# Patient Record
Sex: MALE | Race: WHITE | NOT HISPANIC OR LATINO | Employment: FULL TIME | URBAN - NONMETROPOLITAN AREA
[De-identification: names, ages, dates, MRNs, and addresses within clinical notes are randomized per-mention and may not be internally consistent; named-entity substitution may affect disease eponyms.]

---

## 2017-08-26 ENCOUNTER — APPOINTMENT (OUTPATIENT)
Dept: URGENT CARE | Facility: PHYSICIAN GROUP | Age: 52
End: 2017-08-26
Payer: COMMERCIAL

## 2017-08-26 ENCOUNTER — APPOINTMENT (OUTPATIENT)
Dept: RADIOLOGY | Facility: IMAGING CENTER | Age: 52
End: 2017-08-26
Attending: CHIROPRACTOR
Payer: COMMERCIAL

## 2017-08-26 DIAGNOSIS — M54.5 LOW BACK PAIN, UNSPECIFIED BACK PAIN LATERALITY, UNSPECIFIED CHRONICITY, WITH SCIATICA PRESENCE UNSPECIFIED: ICD-10-CM

## 2017-08-26 PROCEDURE — 72100 X-RAY EXAM L-S SPINE 2/3 VWS: CPT | Mod: TC | Performed by: FAMILY MEDICINE

## 2017-08-26 PROCEDURE — 72170 X-RAY EXAM OF PELVIS: CPT | Mod: TC | Performed by: FAMILY MEDICINE

## 2022-01-06 ENCOUNTER — TELEPHONE (OUTPATIENT)
Dept: SCHEDULING | Facility: IMAGING CENTER | Age: 57
End: 2022-01-06

## 2022-04-18 ENCOUNTER — OFFICE VISIT (OUTPATIENT)
Dept: MEDICAL GROUP | Facility: PHYSICIAN GROUP | Age: 57
End: 2022-04-18
Payer: COMMERCIAL

## 2022-04-18 VITALS
HEART RATE: 73 BPM | BODY MASS INDEX: 28.86 KG/M2 | DIASTOLIC BLOOD PRESSURE: 74 MMHG | RESPIRATION RATE: 18 BRPM | SYSTOLIC BLOOD PRESSURE: 118 MMHG | WEIGHT: 213.1 LBS | TEMPERATURE: 98.2 F | HEIGHT: 72 IN | OXYGEN SATURATION: 94 %

## 2022-04-18 DIAGNOSIS — Z23 ENCOUNTER FOR VACCINATION: ICD-10-CM

## 2022-04-18 DIAGNOSIS — Z00.00 HEALTHCARE MAINTENANCE: ICD-10-CM

## 2022-04-18 DIAGNOSIS — R73.09 ABNORMAL GLUCOSE: ICD-10-CM

## 2022-04-18 DIAGNOSIS — Z86.010 HISTORY OF COLON POLYPS: ICD-10-CM

## 2022-04-18 DIAGNOSIS — I10 PRIMARY HYPERTENSION: ICD-10-CM

## 2022-04-18 DIAGNOSIS — Z23 NEED FOR VACCINATION: ICD-10-CM

## 2022-04-18 DIAGNOSIS — E78.49 OTHER HYPERLIPIDEMIA: ICD-10-CM

## 2022-04-18 DIAGNOSIS — K40.90 NON-RECURRENT UNILATERAL INGUINAL HERNIA WITHOUT OBSTRUCTION OR GANGRENE: ICD-10-CM

## 2022-04-18 DIAGNOSIS — E55.9 VITAMIN D DEFICIENCY: ICD-10-CM

## 2022-04-18 PROBLEM — Z86.0100 HISTORY OF COLON POLYPS: Status: ACTIVE | Noted: 2022-04-18

## 2022-04-18 PROBLEM — K46.9 NON-RECURRENT ABDOMINAL HERNIA WITHOUT OBSTRUCTION OR GANGRENE: Status: ACTIVE | Noted: 2022-04-18

## 2022-04-18 LAB
HBA1C MFR BLD: 4.9 % (ref 0–5.6)
INT CON NEG: NORMAL
INT CON POS: NORMAL

## 2022-04-18 PROCEDURE — 90471 IMMUNIZATION ADMIN: CPT | Performed by: NURSE PRACTITIONER

## 2022-04-18 PROCEDURE — 90715 TDAP VACCINE 7 YRS/> IM: CPT | Performed by: NURSE PRACTITIONER

## 2022-04-18 PROCEDURE — 83036 HEMOGLOBIN GLYCOSYLATED A1C: CPT | Performed by: NURSE PRACTITIONER

## 2022-04-18 PROCEDURE — 99214 OFFICE O/P EST MOD 30 MIN: CPT | Mod: 25 | Performed by: NURSE PRACTITIONER

## 2022-04-18 RX ORDER — AMLODIPINE BESYLATE 10 MG/1
10 TABLET ORAL DAILY
COMMUNITY
Start: 2022-02-24 | End: 2023-05-15 | Stop reason: SDUPTHER

## 2022-04-18 RX ORDER — LISINOPRIL 2.5 MG/1
2.5 TABLET ORAL DAILY
COMMUNITY
Start: 2022-02-10 | End: 2023-05-15 | Stop reason: SDUPTHER

## 2022-04-18 RX ORDER — ATORVASTATIN CALCIUM 40 MG/1
40 TABLET, FILM COATED ORAL DAILY
COMMUNITY
Start: 2022-02-24 | End: 2023-05-15 | Stop reason: SDUPTHER

## 2022-04-18 ASSESSMENT — PATIENT HEALTH QUESTIONNAIRE - PHQ9: CLINICAL INTERPRETATION OF PHQ2 SCORE: 0

## 2022-04-18 NOTE — LETTER
[facility]  Cheryl M. Demucha, A.P.RJUAN JOSÉ.  [address] [place] [address]  Fax: [phone]   Authorization for Release/Disclosure of   Protected Health Information   Name: SERGO ALLAN : 1965 SSN: [id number]   Address: [place] [address] Phone:    [phone] (home)    I authorize the entity listed below to release/disclose the PHI below to:   [facility]/Cheryl M. Demucha, A.P.R.JOB and Cheryl M. Demucha, A.P.R.N.   Provider or Entity Name:     Address   City, State, Zip   Phone:      Fax:     Reason for request: continuity of care   Information to be released:    [  ] LAST COLONOSCOPY,  including any PATH REPORT and follow-up  [  ] LAST FIT/COLOGUARD RESULT [  ] LAST DEXA  [  ] LAST MAMMOGRAM  [  ] LAST PAP  [  ] LAST LABS [  ] RETINA EXAM REPORT  [  ] IMMUNIZATION RECORDS  [  ] Release all info      [  ] Check here and initial the line next to each item to release ALL health information INCLUDING  _____ Care and treatment for drug and / or alcohol abuse  _____ HIV testing, infection status, or AIDS  _____ Genetic Testing    DATES OF SERVICE OR TIME PERIOD TO BE DISCLOSED: _____________  I understand and acknowledge that:  * This Authorization may be revoked at any time by you in writing, except if your health information has already been used or disclosed.  * Your health information that will be used or disclosed as a result of you signing this authorization could be re-disclosed by the recipient. If this occurs, your re-disclosed health information may no longer be protected by State or Federal laws.  * You may refuse to sign this Authorization. Your refusal will not affect your ability to obtain treatment.  * This Authorization becomes effective upon signing and will  on (date) __________.      If no date is indicated, this Authorization will  one (1) year from the signature date.    Name: Sergo Allan    Signature:   Date:     2022       PLEASE  FAX REQUESTED RECORDS BACK TO: [phone]

## 2022-04-18 NOTE — ASSESSMENT & PLAN NOTE
Was advised at his last eye exam that some mild changes/beginning of cataracts were noted; gets eye exam annually     Pt's labs were just done in March 2022  Not due again until next year

## 2022-04-18 NOTE — ASSESSMENT & PLAN NOTE
Pt was told he had an inguinal hernia a few years ago; never had u/s done; isn't bothersome currently; will send for records from [facility]; consider u/s if s/s worsen

## 2022-04-18 NOTE — PROGRESS NOTES
Subjective:     CC:   Chief Complaint   Patient presents with   • Establish Care       HPI:   Sergo presents today with    Healthcare maintenance  Was advised at his last eye exam that some mild changes/beginning of cataracts were noted; gets eye exam annually     Pt's labs were just done in March 2022  Not due again until next year     History of colon polyps  Colonoscopy done in the recent past; will sent for records from [facility]    Abnormal glucose  Most recent labs from March, 2022 showed mildly elevate glucose (108); A1c done in office today-4.9      Other hyperlipidemia  Lipid panel done March, 2022  LDL was 106; all others wnl; cont w/atorvastatin      Primary hypertension  Currently well controlled w/amlodipine and lisinopril; monitor periodically and report >130/90    Non-recurrent abdominal hernia without obstruction or gangrene  Pt was told he had an inguinal hernia a few years ago; never had u/s done; isn't bothersome currently; will send for records from [facility]; consider u/s if s/s worsen              ROS per HPI    Objective:     Exam:  /74   Pulse 73   Temp 36.8 °C (98.2 °F) (Temporal)   Resp 18   Ht 1.829 m (6')   Wt 96.7 kg (213 lb 1.6 oz)   SpO2 94%   BMI 28.90 kg/m²  Body mass index is 28.9 kg/m².    Physical Exam:  Constitutional: Well-developed and well-nourished male  in NAD. Not diaphoretic. No distress.   Skin: warm, dry, intact, no evidence of rash or concerning lesions  Head: Atraumatic without lesions.  Eyes: Conjunctivae  are normal. Pupils are equal, round. No scleral icterus.   Ears:  External ears unremarkable.   Neck: Supple, trachea midline.   Cardiovascular: Regular rate and rhythm without murmur.   Pulmonary: Clear to ausculation. Normal effort. No rales, ronchi, or wheezing.  Extremities: No cyanosis, clubbing, erythema, nor edema.   Neurological: Alert and oriented x 3.   Psychiatric:  Behavior, mood, and affect are appropriate.        Assessment & Plan:     56 y.o.  male with the following -     1. Abnormal glucose A1c in office today was 4.9     2. History of colon polyps sending for records     3. Other hyperlipidemia cont w/current medication; cont w/healthy lifestyle choices     4. Primary hypertension  Monitor 1-2x a week; report values >130/90    5. Healthcare maintenance labs due 1 year     6. Non-recurrent unilateral inguinal hernia without obstruction or gangrene monitor     7. Vitamin D deficiency lab ordered    8. Encounter for vaccination td given today; pt will get Shingrix at pharmacy     Other orders  - amLODIPine (NORVASC) 10 MG Tab; Take 10 mg by mouth every day. FOR 90 DAYS  - atorvastatin (LIPITOR) 40 MG Tab; Take 40 mg by mouth every day. FOR 90 DAYS  - lisinopril (PRINIVIL) 2.5 MG Tab; Take 2.5 mg by mouth every day. FOR 90 DAYS     Pt will get vit d done in near future; I will message him w/the results and order his annual labs to be done end March 2023 so he can do them before our visit     Return in about 1 year (around 4/18/2023) for lab results, gen check in.    Please note that this dictation was created using voice recognition software. I have made every reasonable attempt to correct obvious errors, but I expect that there are errors of grammar and possibly content that I did not discover before finalizing the note.

## 2022-04-18 NOTE — ASSESSMENT & PLAN NOTE
Most recent labs from March, 2022 showed mildly elevate glucose (108); A1c done in office today-4.9

## 2022-04-25 ENCOUNTER — HOSPITAL ENCOUNTER (OUTPATIENT)
Dept: LAB | Facility: MEDICAL CENTER | Age: 57
End: 2022-04-25
Attending: NURSE PRACTITIONER
Payer: COMMERCIAL

## 2022-04-25 DIAGNOSIS — E55.9 VITAMIN D DEFICIENCY: ICD-10-CM

## 2022-04-25 LAB — 25(OH)D3 SERPL-MCNC: 41 NG/ML (ref 30–100)

## 2022-04-25 PROCEDURE — 82306 VITAMIN D 25 HYDROXY: CPT

## 2022-04-25 PROCEDURE — 36415 COLL VENOUS BLD VENIPUNCTURE: CPT

## 2022-04-28 DIAGNOSIS — I10 PRIMARY HYPERTENSION: ICD-10-CM

## 2022-04-28 DIAGNOSIS — E78.49 OTHER HYPERLIPIDEMIA: ICD-10-CM

## 2022-04-28 DIAGNOSIS — R53.83 FATIGUE, UNSPECIFIED TYPE: ICD-10-CM

## 2022-04-28 DIAGNOSIS — E55.9 VITAMIN D DEFICIENCY: ICD-10-CM

## 2022-04-28 DIAGNOSIS — Z12.5 SCREENING FOR PROSTATE CANCER: ICD-10-CM

## 2022-04-28 RX ORDER — SILDENAFIL 50 MG/1
TABLET, FILM COATED ORAL
COMMUNITY
Start: 2022-04-22 | End: 2023-05-15 | Stop reason: SDUPTHER

## 2023-05-13 ENCOUNTER — HOSPITAL ENCOUNTER (OUTPATIENT)
Dept: LAB | Facility: MEDICAL CENTER | Age: 58
End: 2023-05-13
Attending: NURSE PRACTITIONER
Payer: COMMERCIAL

## 2023-05-13 DIAGNOSIS — I10 PRIMARY HYPERTENSION: ICD-10-CM

## 2023-05-13 DIAGNOSIS — Z12.5 SCREENING FOR PROSTATE CANCER: ICD-10-CM

## 2023-05-13 DIAGNOSIS — E55.9 VITAMIN D DEFICIENCY: ICD-10-CM

## 2023-05-13 DIAGNOSIS — E78.49 OTHER HYPERLIPIDEMIA: ICD-10-CM

## 2023-05-13 DIAGNOSIS — R53.83 FATIGUE, UNSPECIFIED TYPE: ICD-10-CM

## 2023-05-13 LAB
25(OH)D3 SERPL-MCNC: 53 NG/ML (ref 30–100)
ALBUMIN SERPL BCP-MCNC: 4.4 G/DL (ref 3.2–4.9)
ALBUMIN/GLOB SERPL: 1.6 G/DL
ALP SERPL-CCNC: 68 U/L (ref 30–99)
ALT SERPL-CCNC: 22 U/L (ref 2–50)
ANION GAP SERPL CALC-SCNC: 11 MMOL/L (ref 7–16)
AST SERPL-CCNC: 16 U/L (ref 12–45)
BASOPHILS # BLD AUTO: 1.1 % (ref 0–1.8)
BASOPHILS # BLD: 0.05 K/UL (ref 0–0.12)
BILIRUB SERPL-MCNC: 0.3 MG/DL (ref 0.1–1.5)
BUN SERPL-MCNC: 13 MG/DL (ref 8–22)
CALCIUM ALBUM COR SERPL-MCNC: 9.2 MG/DL (ref 8.5–10.5)
CALCIUM SERPL-MCNC: 9.5 MG/DL (ref 8.5–10.5)
CHLORIDE SERPL-SCNC: 108 MMOL/L (ref 96–112)
CHOLEST SERPL-MCNC: 171 MG/DL (ref 100–199)
CO2 SERPL-SCNC: 23 MMOL/L (ref 20–33)
CREAT SERPL-MCNC: 1.03 MG/DL (ref 0.5–1.4)
EOSINOPHIL # BLD AUTO: 0.13 K/UL (ref 0–0.51)
EOSINOPHIL NFR BLD: 2.8 % (ref 0–6.9)
ERYTHROCYTE [DISTWIDTH] IN BLOOD BY AUTOMATED COUNT: 38.7 FL (ref 35.9–50)
FASTING STATUS PATIENT QL REPORTED: NORMAL
GFR SERPLBLD CREATININE-BSD FMLA CKD-EPI: 85 ML/MIN/1.73 M 2
GLOBULIN SER CALC-MCNC: 2.8 G/DL (ref 1.9–3.5)
GLUCOSE SERPL-MCNC: 106 MG/DL (ref 65–99)
HCT VFR BLD AUTO: 45.4 % (ref 42–52)
HDLC SERPL-MCNC: 50 MG/DL
HGB BLD-MCNC: 15.1 G/DL (ref 14–18)
IMM GRANULOCYTES # BLD AUTO: 0.01 K/UL (ref 0–0.11)
IMM GRANULOCYTES NFR BLD AUTO: 0.2 % (ref 0–0.9)
LDLC SERPL CALC-MCNC: 108 MG/DL
LYMPHOCYTES # BLD AUTO: 1.3 K/UL (ref 1–4.8)
LYMPHOCYTES NFR BLD: 28.3 % (ref 22–41)
MCH RBC QN AUTO: 29 PG (ref 27–33)
MCHC RBC AUTO-ENTMCNC: 33.3 G/DL (ref 33.7–35.3)
MCV RBC AUTO: 87.3 FL (ref 81.4–97.8)
MONOCYTES # BLD AUTO: 0.45 K/UL (ref 0–0.85)
MONOCYTES NFR BLD AUTO: 9.8 % (ref 0–13.4)
NEUTROPHILS # BLD AUTO: 2.65 K/UL (ref 1.82–7.42)
NEUTROPHILS NFR BLD: 57.8 % (ref 44–72)
NRBC # BLD AUTO: 0 K/UL
NRBC BLD-RTO: 0 /100 WBC
PLATELET # BLD AUTO: 201 K/UL (ref 164–446)
PMV BLD AUTO: 10 FL (ref 9–12.9)
POTASSIUM SERPL-SCNC: 4.7 MMOL/L (ref 3.6–5.5)
PROT SERPL-MCNC: 7.2 G/DL (ref 6–8.2)
PSA SERPL-MCNC: 1.97 NG/ML (ref 0–4)
RBC # BLD AUTO: 5.2 M/UL (ref 4.7–6.1)
SODIUM SERPL-SCNC: 142 MMOL/L (ref 135–145)
TRIGL SERPL-MCNC: 63 MG/DL (ref 0–149)
WBC # BLD AUTO: 4.6 K/UL (ref 4.8–10.8)

## 2023-05-13 PROCEDURE — 36415 COLL VENOUS BLD VENIPUNCTURE: CPT

## 2023-05-13 PROCEDURE — 84153 ASSAY OF PSA TOTAL: CPT

## 2023-05-13 PROCEDURE — 82306 VITAMIN D 25 HYDROXY: CPT

## 2023-05-13 PROCEDURE — 80061 LIPID PANEL: CPT

## 2023-05-13 PROCEDURE — 80053 COMPREHEN METABOLIC PANEL: CPT

## 2023-05-13 PROCEDURE — 85025 COMPLETE CBC W/AUTO DIFF WBC: CPT

## 2023-05-15 ENCOUNTER — OFFICE VISIT (OUTPATIENT)
Dept: MEDICAL GROUP | Facility: PHYSICIAN GROUP | Age: 58
End: 2023-05-15
Payer: COMMERCIAL

## 2023-05-15 VITALS
OXYGEN SATURATION: 97 % | DIASTOLIC BLOOD PRESSURE: 76 MMHG | WEIGHT: 215.9 LBS | SYSTOLIC BLOOD PRESSURE: 120 MMHG | BODY MASS INDEX: 29.24 KG/M2 | TEMPERATURE: 97.1 F | RESPIRATION RATE: 18 BRPM | HEIGHT: 72 IN | HEART RATE: 70 BPM

## 2023-05-15 DIAGNOSIS — N52.9 ERECTILE DYSFUNCTION, UNSPECIFIED ERECTILE DYSFUNCTION TYPE: ICD-10-CM

## 2023-05-15 DIAGNOSIS — R73.09 ABNORMAL GLUCOSE: ICD-10-CM

## 2023-05-15 DIAGNOSIS — K40.90 NON-RECURRENT UNILATERAL INGUINAL HERNIA WITHOUT OBSTRUCTION OR GANGRENE: ICD-10-CM

## 2023-05-15 DIAGNOSIS — Z86.010 HISTORY OF COLON POLYPS: ICD-10-CM

## 2023-05-15 DIAGNOSIS — I10 PRIMARY HYPERTENSION: ICD-10-CM

## 2023-05-15 DIAGNOSIS — E78.49 OTHER HYPERLIPIDEMIA: ICD-10-CM

## 2023-05-15 PROCEDURE — 3078F DIAST BP <80 MM HG: CPT | Performed by: NURSE PRACTITIONER

## 2023-05-15 PROCEDURE — 99214 OFFICE O/P EST MOD 30 MIN: CPT | Performed by: NURSE PRACTITIONER

## 2023-05-15 PROCEDURE — 3074F SYST BP LT 130 MM HG: CPT | Performed by: NURSE PRACTITIONER

## 2023-05-15 RX ORDER — AMLODIPINE BESYLATE 10 MG/1
10 TABLET ORAL DAILY
Qty: 90 TABLET | Refills: 3 | Status: SHIPPED | OUTPATIENT
Start: 2023-05-15 | End: 2024-05-14

## 2023-05-15 RX ORDER — ATORVASTATIN CALCIUM 40 MG/1
40 TABLET, FILM COATED ORAL DAILY
Qty: 90 TABLET | Refills: 3 | Status: SHIPPED | OUTPATIENT
Start: 2023-05-15 | End: 2024-05-14

## 2023-05-15 RX ORDER — LISINOPRIL 2.5 MG/1
2.5 TABLET ORAL DAILY
Qty: 90 TABLET | Refills: 3 | Status: SHIPPED | OUTPATIENT
Start: 2023-05-15 | End: 2024-05-14

## 2023-05-15 RX ORDER — SILDENAFIL 50 MG/1
TABLET, FILM COATED ORAL
Qty: 10 TABLET | Refills: 11 | Status: SHIPPED | OUTPATIENT
Start: 2023-05-15

## 2023-05-15 ASSESSMENT — FIBROSIS 4 INDEX: FIB4 SCORE: 0.95

## 2023-05-15 ASSESSMENT — PATIENT HEALTH QUESTIONNAIRE - PHQ9: CLINICAL INTERPRETATION OF PHQ2 SCORE: 0

## 2023-05-15 NOTE — ASSESSMENT & PLAN NOTE
Records from [facility] for colonoscopy received, dated 1/2020  2 polyps removed from cecum; rec 3 yr f/up 1/2023

## 2023-05-15 NOTE — PROGRESS NOTES
Subjective:     CC:   Chief Complaint   Patient presents with    Medication Refill    Annual Exam       HPI:   Sergo presents today with    History of colon polyps  Records from [facility] for colonoscopy received, dated 1/2020  2 polyps removed from cecum; rec 3 yr f/up 1/2023    Abnormal glucose  Well controllled; most recent labs only showed slight elevation    Non-recurrent abdominal hernia without obstruction or gangrene  Pt reports feeling a little pain a few times from his hernia; would like to know the name of a surgeon who specializes in that  Was advised Migel Castle specializes in that, so I gave him his name; I also advised him to check [email or web address], [email or web address] and [email or web address] for patient reviews            ROS per HPI    Objective:     Exam:  /76   Pulse 70   Temp 36.2 °C (97.1 °F) (Temporal)   Resp 18   Ht 1.829 m (6')   Wt 97.9 kg (215 lb 14.4 oz)   SpO2 97%   BMI 29.28 kg/m²  Body mass index is 29.28 kg/m².    Physical Exam:  Constitutional: Well-developed and well-nourished male Not diaphoretic. No distress.   Skin: warm, dry, intact, no evidence of rash or concerning lesions  Head: Atraumatic without lesions.  Eyes: Conjunctivae are normal. Pupils are equal, round. No scleral icterus.   Ears:  External ears unremarkable.   Neck: Supple, trachea midline. No thyromegaly present. No cervical or supraclavicular lymphadenopathy.  Cardiovascular: Regular rate and rhythm without murmur.   Pulmonary: Clear to ausculation. Normal effort. No rales, ronchi, or wheezing.  Extremities: No cyanosis, clubbing, erythema, nor edema.   Neurological: Alert and oriented x 3.   Psychiatric:  Behavior, mood, and affect are appropriate.        Assessment & Plan:     57 y.o. male with the following -     1. Primary hypertension  - amLODIPine (NORVASC) 10 MG Tab; Take 1 Tablet by mouth every day.  DAYS  Dispense: 90 Tablet; Refill: 3  - lisinopril (PRINIVIL) 2.5 MG Tab; Take 1 Tablet by mouth  every day.  days  Dispense: 90 Tablet; Refill: 3    2. Erectile dysfunction, unspecified erectile dysfunction type  - sildenafil citrate (VIAGRA) 50 MG tablet; TAKE 1 TABLET BY MOUTH ONCE DAILY 1 HOUR BEFORE SEXUAL ACTIVITY  Dispense: 10 Tablet; Refill: 11    3. Other hyperlipidemia  - atorvastatin (LIPITOR) 40 MG Tab; Take 1 Tablet by mouth every day. For 365 days  Dispense: 90 Tablet; Refill: 3    4. History of colon polyps  Most recent colonoscopy in Jan, 2020 recommended 3 year f/up; pt would like to wait a little longer     5. Abnormal glucose    6. Non-recurrent unilateral inguinal hernia without obstruction or gangrene  Will refer to general surgery when pt is ready         Return in about 1 year (around 5/15/2024) for lab results.    Please note that this dictation was created using voice recognition software. I have made every reasonable attempt to correct obvious errors, but I expect that there are errors of grammar and possibly content that I did not discover before finalizing the note.

## 2023-05-16 NOTE — ASSESSMENT & PLAN NOTE
Pt reports feeling a little pain a few times from his hernia; would like to know the name of a surgeon who specializes in that  Was advised Migel Castle specializes in that, so I gave him his name; I also advised him to check [email or web address], [email or web address] and [email or web address] for patient reviews

## 2023-06-22 ENCOUNTER — OFFICE VISIT (OUTPATIENT)
Dept: URGENT CARE | Facility: PHYSICIAN GROUP | Age: 58
End: 2023-06-22
Payer: COMMERCIAL

## 2023-06-22 VITALS
HEIGHT: 72 IN | TEMPERATURE: 98 F | WEIGHT: 216 LBS | RESPIRATION RATE: 16 BRPM | HEART RATE: 89 BPM | BODY MASS INDEX: 29.26 KG/M2 | DIASTOLIC BLOOD PRESSURE: 72 MMHG | SYSTOLIC BLOOD PRESSURE: 128 MMHG | OXYGEN SATURATION: 97 %

## 2023-06-22 DIAGNOSIS — R42 DIZZINESS: ICD-10-CM

## 2023-06-22 DIAGNOSIS — H60.399 ACUTE BACTERIAL OTITIS EXTERNA: ICD-10-CM

## 2023-06-22 LAB — GLUCOSE BLD-MCNC: 94 MG/DL (ref 65–99)

## 2023-06-22 PROCEDURE — 3078F DIAST BP <80 MM HG: CPT | Performed by: NURSE PRACTITIONER

## 2023-06-22 PROCEDURE — 82962 GLUCOSE BLOOD TEST: CPT | Performed by: NURSE PRACTITIONER

## 2023-06-22 PROCEDURE — 99213 OFFICE O/P EST LOW 20 MIN: CPT | Performed by: NURSE PRACTITIONER

## 2023-06-22 PROCEDURE — 3074F SYST BP LT 130 MM HG: CPT | Performed by: NURSE PRACTITIONER

## 2023-06-22 RX ORDER — OFLOXACIN 3 MG/ML
10 SOLUTION AURICULAR (OTIC) DAILY
Qty: 14 ML | Refills: 0 | Status: SHIPPED | OUTPATIENT
Start: 2023-06-22 | End: 2023-06-29

## 2023-06-22 RX ORDER — MECLIZINE HYDROCHLORIDE 25 MG/1
TABLET ORAL
Qty: 30 TABLET | Refills: 0 | Status: SHIPPED | OUTPATIENT
Start: 2023-06-22

## 2023-06-22 ASSESSMENT — FIBROSIS 4 INDEX: FIB4 SCORE: 0.95

## 2023-06-22 ASSESSMENT — VISUAL ACUITY: OU: 1

## 2023-06-22 NOTE — PROGRESS NOTES
Subjective:   Sergo Allan is a 57 y.o. male who presents for Vertigo (X6 days was laying down on floor and felt really dizzy, room spins worse in the mornings and gets better throughout the day,)      Very pleasant 57-year-old male presents to clinic today with 6-day history of dizziness and mild nausea.  Patient states that symptoms seem to be worse first thing in the morning and do improve throughout the day.  About 6 days ago he was doing stretching exercises and twisted his head to the right and when he looked forward he got severely dizzy and the symptoms have discontinued.  Has not tried any over-the-counter medications.    Complains of dizziness described as a spinning sensation  Occurs with  rapid head movements, rolling over in bed, first thing in the morning when going from a sitting to standing position  Denies dimming vision, visual floaters, speech change, confusion, unconsciousness, drowsiness, personality change, headaches, paresthesias otalgia, otorrhea, tinnitus, hearing loss         He does state that his symptoms seem to be improving.        Medications, Allergies, and current problem list reviewed today in Epic.     Objective:     /72   Pulse 89   Temp 36.7 °C (98 °F) (Temporal)   Resp 16   Ht 1.829 m (6')   Wt 98 kg (216 lb)   SpO2 97%     Physical Exam  Vitals reviewed.   Constitutional:       Appearance: Normal appearance.   HENT:      Head: Normocephalic.      Left Ear: Tympanic membrane, ear canal and external ear normal.      Ears:      Comments: Right ear canal: Mildly erythematous, moist white patches with tenderness during otic exam.  TM mildly erythematous with serosanguineous effusion.     Nose: Nose normal.      Mouth/Throat:      Mouth: Mucous membranes are moist.   Eyes:      General: Lids are normal. Vision grossly intact. Gaze aligned appropriately.      Extraocular Movements: Extraocular movements intact.      Right eye: No nystagmus.      Left eye: No nystagmus.       Conjunctiva/sclera: Conjunctivae normal.      Pupils: Pupils are equal, round, and reactive to light.   Cardiovascular:      Rate and Rhythm: Normal rate.   Pulmonary:      Effort: Pulmonary effort is normal.   Musculoskeletal:         General: Normal range of motion.      Cervical back: Normal range of motion and neck supple.   Skin:     General: Skin is warm and dry.   Neurological:      General: No focal deficit present.      Mental Status: He is alert and oriented to person, place, and time.      Comments: Sunni-Hallpike: negative  negative for dizziness or nystagmus    Psychiatric:         Mood and Affect: Mood normal.         Behavior: Behavior normal.         Assessment/Plan:     Diagnosis and associated orders:     1. Acute bacterial otitis externa  ofloxacin otic sol (FLOXIN OTIC) 0.3 % Solution      2. Dizziness  POCT Glucose    meclizine (ANTIVERT) 25 MG Tab         Comments/MDM:     HPI and physical exam findings are consistent with acute bacterial otitis externa most likely causing his symptoms of dizziness.  We will go ahead and treat with ofloxacin.  Prescription of meclizine to sent to pharmacy to help with intermittent vertigo symptoms.  Side effects medication discussed.  Recommended Tylenol Motrin as needed for pain.  POCT glucose was 94.  Low suspicion at this time that his dizziness symptoms are caused by hyper or hypoglycemia.  Recommended following up with primary care provider in the next 10 to 14 days.         Differential diagnosis, natural history, supportive care, and indications for immediate follow-up discussed.    Advised the patient to follow-up with the primary care physician for recheck, reevaluation, and consideration of further management.    Please note that this dictation was created using voice recognition software. I have made a reasonable attempt to correct obvious errors, but I expect that there are errors of grammar and possibly content that I did not discover before  finalizing the note.

## 2023-09-01 ENCOUNTER — APPOINTMENT (OUTPATIENT)
Dept: RADIOLOGY | Facility: MEDICAL CENTER | Age: 58
End: 2023-09-01
Attending: OTOLARYNGOLOGY
Payer: COMMERCIAL

## 2023-09-01 DIAGNOSIS — R42 VERTIGO: ICD-10-CM

## 2023-09-01 PROCEDURE — 700117 HCHG RX CONTRAST REV CODE 255: Performed by: OTOLARYNGOLOGY

## 2023-09-01 PROCEDURE — A9579 GAD-BASE MR CONTRAST NOS,1ML: HCPCS | Performed by: OTOLARYNGOLOGY

## 2023-09-01 PROCEDURE — 70553 MRI BRAIN STEM W/O & W/DYE: CPT

## 2023-09-01 RX ADMIN — GADOTERIDOL 15 ML: 279.3 INJECTION, SOLUTION INTRAVENOUS at 14:21

## 2024-05-13 ENCOUNTER — TELEPHONE (OUTPATIENT)
Dept: MEDICAL GROUP | Facility: PHYSICIAN GROUP | Age: 59
End: 2024-05-13

## 2024-05-13 ENCOUNTER — APPOINTMENT (OUTPATIENT)
Dept: MEDICAL GROUP | Facility: PHYSICIAN GROUP | Age: 59
End: 2024-05-13
Payer: COMMERCIAL

## 2024-05-13 VITALS
HEIGHT: 72 IN | HEART RATE: 77 BPM | SYSTOLIC BLOOD PRESSURE: 118 MMHG | RESPIRATION RATE: 16 BRPM | WEIGHT: 222 LBS | DIASTOLIC BLOOD PRESSURE: 80 MMHG | TEMPERATURE: 98.8 F | BODY MASS INDEX: 30.07 KG/M2 | OXYGEN SATURATION: 96 %

## 2024-05-13 DIAGNOSIS — E55.9 VITAMIN D DEFICIENCY: ICD-10-CM

## 2024-05-13 DIAGNOSIS — R73.09 ABNORMAL GLUCOSE: ICD-10-CM

## 2024-05-13 DIAGNOSIS — R53.83 OTHER FATIGUE: ICD-10-CM

## 2024-05-13 DIAGNOSIS — E78.49 OTHER HYPERLIPIDEMIA: ICD-10-CM

## 2024-05-13 DIAGNOSIS — Z12.5 ENCOUNTER FOR SCREENING FOR MALIGNANT NEOPLASM OF PROSTATE: ICD-10-CM

## 2024-05-13 DIAGNOSIS — N52.9 ERECTILE DYSFUNCTION, UNSPECIFIED ERECTILE DYSFUNCTION TYPE: ICD-10-CM

## 2024-05-13 PROCEDURE — 3079F DIAST BP 80-89 MM HG: CPT | Performed by: NURSE PRACTITIONER

## 2024-05-13 PROCEDURE — 3074F SYST BP LT 130 MM HG: CPT | Performed by: NURSE PRACTITIONER

## 2024-05-13 PROCEDURE — 99214 OFFICE O/P EST MOD 30 MIN: CPT | Performed by: NURSE PRACTITIONER

## 2024-05-13 RX ORDER — SILDENAFIL 50 MG/1
TABLET, FILM COATED ORAL
Qty: 10 TABLET | Refills: 11 | Status: SHIPPED | OUTPATIENT
Start: 2024-05-13

## 2024-05-13 ASSESSMENT — FIBROSIS 4 INDEX: FIB4 SCORE: 0.97

## 2024-05-13 ASSESSMENT — PATIENT HEALTH QUESTIONNAIRE - PHQ9: CLINICAL INTERPRETATION OF PHQ2 SCORE: 0

## 2024-05-13 NOTE — PROGRESS NOTES
Subjective:     CC:   Chief Complaint   Patient presents with    Medication Refill       HPI:   Dago presents today with    No problem-specific Assessment & Plan notes found for this encounter.              ROS per HPI    Objective:     Exam:  /80   Pulse 77   Temp 37.1 °C (98.8 °F) (Temporal)   Resp 16   Ht 1.829 m (6')   Wt 101 kg (222 lb)   SpO2 96%   BMI 30.11 kg/m²  Body mass index is 30.11 kg/m².    Physical Exam:  Constitutional: Well-developed and well-nourished male. Not diaphoretic. No distress.   Skin: warm, dry, intact, no evidence of rash or concerning lesions  Head: Atraumatic without lesions.  Eyes: Conjunctivae are normal. Pupils are equal, round. No scleral icterus.   Ears:  External ears unremarkable.   Neck: Supple, trachea midline. No thyromegaly present. No cervical or supraclavicular lymphadenopathy.  Cardiovascular: Regular rate and rhythm without murmur.   Pulmonary: Clear to ausculation. Normal effort. No rales, ronchi, or wheezing.  Extremities: No cyanosis, clubbing, erythema, nor edema.   Neurological: Alert and oriented x 3.   Psychiatric:  Behavior, mood, and affect are appropriate.        Assessment & Plan:     58 y.o. male with the following -     1. Erectile dysfunction, unspecified erectile dysfunction type  - sildenafil citrate (VIAGRA) 50 MG tablet; TAKE 1 TABLET BY MOUTH ONCE DAILY 1 HOUR BEFORE SEXUAL ACTIVITY  Dispense: 10 Tablet; Refill: 11    2. Abnormal glucose  - Comp Metabolic Panel; Future  - HEMOGLOBIN A1C; Future    3. Other hyperlipidemia  - Lipid Profile; Future    4. Vitamin D deficiency  - VITAMIN D,25 HYDROXY (DEFICIENCY); Future    5. Other fatigue  - CBC WITH DIFFERENTIAL; Future  - Comp Metabolic Panel; Future  - TSH WITH REFLEX TO FT4; Future    6. Encounter for screening for malignant neoplasm of prostate  - PROSTATE SPECIFIC AG SCREENING; Future       Will message pt w/lab results next week   Return in about 1 year (around 5/13/2025) for est care  w/new pcp as my last day is 5/30.    Please note that this dictation was created using voice recognition software. I have made every reasonable attempt to correct obvious errors, but I expect that there are errors of grammar and possibly content that I did not discover before finalizing the note.

## 2024-05-21 ENCOUNTER — HOSPITAL ENCOUNTER (OUTPATIENT)
Dept: LAB | Facility: MEDICAL CENTER | Age: 59
End: 2024-05-21
Attending: NURSE PRACTITIONER
Payer: COMMERCIAL

## 2024-05-21 DIAGNOSIS — E78.49 OTHER HYPERLIPIDEMIA: ICD-10-CM

## 2024-05-21 DIAGNOSIS — R73.09 ABNORMAL GLUCOSE: ICD-10-CM

## 2024-05-21 DIAGNOSIS — Z12.5 ENCOUNTER FOR SCREENING FOR MALIGNANT NEOPLASM OF PROSTATE: ICD-10-CM

## 2024-05-21 DIAGNOSIS — E55.9 VITAMIN D DEFICIENCY: ICD-10-CM

## 2024-05-21 DIAGNOSIS — R53.83 OTHER FATIGUE: ICD-10-CM

## 2024-05-21 LAB
BASOPHILS # BLD AUTO: 1.1 % (ref 0–1.8)
BASOPHILS # BLD: 0.05 K/UL (ref 0–0.12)
EOSINOPHIL # BLD AUTO: 0.14 K/UL (ref 0–0.51)
EOSINOPHIL NFR BLD: 3 % (ref 0–6.9)
ERYTHROCYTE [DISTWIDTH] IN BLOOD BY AUTOMATED COUNT: 38.4 FL (ref 35.9–50)
EST. AVERAGE GLUCOSE BLD GHB EST-MCNC: 103 MG/DL
HBA1C MFR BLD: 5.2 % (ref 4–5.6)
HCT VFR BLD AUTO: 43.8 % (ref 42–52)
HGB BLD-MCNC: 14.8 G/DL (ref 14–18)
IMM GRANULOCYTES # BLD AUTO: 0.01 K/UL (ref 0–0.11)
IMM GRANULOCYTES NFR BLD AUTO: 0.2 % (ref 0–0.9)
LYMPHOCYTES # BLD AUTO: 1.44 K/UL (ref 1–4.8)
LYMPHOCYTES NFR BLD: 30.8 % (ref 22–41)
MCH RBC QN AUTO: 29 PG (ref 27–33)
MCHC RBC AUTO-ENTMCNC: 33.8 G/DL (ref 32.3–36.5)
MCV RBC AUTO: 85.7 FL (ref 81.4–97.8)
MONOCYTES # BLD AUTO: 0.45 K/UL (ref 0–0.85)
MONOCYTES NFR BLD AUTO: 9.6 % (ref 0–13.4)
NEUTROPHILS # BLD AUTO: 2.58 K/UL (ref 1.82–7.42)
NEUTROPHILS NFR BLD: 55.3 % (ref 44–72)
NRBC # BLD AUTO: 0 K/UL
NRBC BLD-RTO: 0 /100 WBC (ref 0–0.2)
PLATELET # BLD AUTO: 201 K/UL (ref 164–446)
PMV BLD AUTO: 11 FL (ref 9–12.9)
RBC # BLD AUTO: 5.11 M/UL (ref 4.7–6.1)
WBC # BLD AUTO: 4.7 K/UL (ref 4.8–10.8)

## 2024-05-22 LAB
25(OH)D3 SERPL-MCNC: 67 NG/ML (ref 30–100)
ALBUMIN SERPL BCP-MCNC: 4.4 G/DL (ref 3.2–4.9)
ALBUMIN/GLOB SERPL: 1.8 G/DL
ALP SERPL-CCNC: 63 U/L (ref 30–99)
ALT SERPL-CCNC: 24 U/L (ref 2–50)
ANION GAP SERPL CALC-SCNC: 12 MMOL/L (ref 7–16)
AST SERPL-CCNC: 23 U/L (ref 12–45)
BILIRUB SERPL-MCNC: 0.5 MG/DL (ref 0.1–1.5)
BUN SERPL-MCNC: 23 MG/DL (ref 8–22)
CALCIUM ALBUM COR SERPL-MCNC: 9.3 MG/DL (ref 8.5–10.5)
CALCIUM SERPL-MCNC: 9.6 MG/DL (ref 8.5–10.5)
CHLORIDE SERPL-SCNC: 105 MMOL/L (ref 96–112)
CHOLEST SERPL-MCNC: 176 MG/DL (ref 100–199)
CO2 SERPL-SCNC: 22 MMOL/L (ref 20–33)
CREAT SERPL-MCNC: 0.93 MG/DL (ref 0.5–1.4)
FASTING STATUS PATIENT QL REPORTED: NORMAL
GFR SERPLBLD CREATININE-BSD FMLA CKD-EPI: 95 ML/MIN/1.73 M 2
GLOBULIN SER CALC-MCNC: 2.5 G/DL (ref 1.9–3.5)
GLUCOSE SERPL-MCNC: 98 MG/DL (ref 65–99)
HDLC SERPL-MCNC: 51 MG/DL
LDLC SERPL CALC-MCNC: 110 MG/DL
POTASSIUM SERPL-SCNC: 4.3 MMOL/L (ref 3.6–5.5)
PROT SERPL-MCNC: 6.9 G/DL (ref 6–8.2)
PSA SERPL-MCNC: 1.09 NG/ML (ref 0–4)
SODIUM SERPL-SCNC: 139 MMOL/L (ref 135–145)
TRIGL SERPL-MCNC: 73 MG/DL (ref 0–149)
TSH SERPL DL<=0.005 MIU/L-ACNC: 0.83 UIU/ML (ref 0.38–5.33)

## 2024-05-29 DIAGNOSIS — E78.49 OTHER HYPERLIPIDEMIA: ICD-10-CM

## 2024-05-29 DIAGNOSIS — I10 PRIMARY HYPERTENSION: ICD-10-CM

## 2024-05-29 RX ORDER — AMLODIPINE BESYLATE 10 MG/1
10 TABLET ORAL DAILY
Qty: 90 TABLET | Refills: 3 | Status: SHIPPED | OUTPATIENT
Start: 2024-05-29 | End: 2025-05-29

## 2024-05-29 RX ORDER — ATORVASTATIN CALCIUM 40 MG/1
40 TABLET, FILM COATED ORAL DAILY
Qty: 90 TABLET | Refills: 3 | Status: SHIPPED | OUTPATIENT
Start: 2024-05-29 | End: 2025-05-29

## 2024-05-29 RX ORDER — ATORVASTATIN CALCIUM 40 MG/1
40 TABLET, FILM COATED ORAL DAILY
Qty: 90 TABLET | Refills: 0 | OUTPATIENT
Start: 2024-05-29

## 2024-05-29 RX ORDER — AMLODIPINE BESYLATE 10 MG/1
10 TABLET ORAL DAILY
Qty: 90 TABLET | Refills: 0 | OUTPATIENT
Start: 2024-05-29

## 2024-05-29 RX ORDER — LISINOPRIL 2.5 MG/1
2.5 TABLET ORAL DAILY
Qty: 90 TABLET | Refills: 3 | Status: SHIPPED | OUTPATIENT
Start: 2024-05-29 | End: 2025-05-29

## 2024-05-29 NOTE — TELEPHONE ENCOUNTER
Requested Prescriptions     Pending Prescriptions Disp Refills    amLODIPine (NORVASC) 10 MG Tab 90 Tablet 3     Sig: Take 1 Tablet by mouth every day.  DAYS    atorvastatin (LIPITOR) 40 MG Tab 90 Tablet 3     Sig: Take 1 Tablet by mouth every day. For 365 days    lisinopril (PRINIVIL) 2.5 MG Tab 90 Tablet 3     Sig: Take 1 Tablet by mouth every day.  days      Last office visit: 5/13/24  Last lab: 5/21/24

## 2024-06-14 ENCOUNTER — DOCUMENTATION (OUTPATIENT)
Dept: HEALTH INFORMATION MANAGEMENT | Facility: OTHER | Age: 59
End: 2024-06-14
Payer: COMMERCIAL

## 2024-07-08 ENCOUNTER — TELEPHONE (OUTPATIENT)
Dept: HEALTH INFORMATION MANAGEMENT | Facility: OTHER | Age: 59
End: 2024-07-08
Payer: COMMERCIAL

## 2024-10-24 ENCOUNTER — OFFICE VISIT (OUTPATIENT)
Dept: DERMATOLOGY | Facility: IMAGING CENTER | Age: 59
End: 2024-10-24
Payer: COMMERCIAL

## 2024-10-24 DIAGNOSIS — D23.9 DERMATOFIBROMA: ICD-10-CM

## 2024-10-24 DIAGNOSIS — L57.0 ACTINIC KERATOSIS: ICD-10-CM

## 2024-10-24 PROCEDURE — 99203 OFFICE O/P NEW LOW 30 MIN: CPT | Mod: 25 | Performed by: STUDENT IN AN ORGANIZED HEALTH CARE EDUCATION/TRAINING PROGRAM

## 2024-10-24 PROCEDURE — 17000 DESTRUCT PREMALG LESION: CPT | Performed by: STUDENT IN AN ORGANIZED HEALTH CARE EDUCATION/TRAINING PROGRAM

## 2025-01-01 SDOH — HEALTH STABILITY: PHYSICAL HEALTH: ON AVERAGE, HOW MANY DAYS PER WEEK DO YOU ENGAGE IN MODERATE TO STRENUOUS EXERCISE (LIKE A BRISK WALK)?: 3 DAYS

## 2025-01-01 SDOH — ECONOMIC STABILITY: FOOD INSECURITY: WITHIN THE PAST 12 MONTHS, YOU WORRIED THAT YOUR FOOD WOULD RUN OUT BEFORE YOU GOT MONEY TO BUY MORE.: NEVER TRUE

## 2025-01-01 SDOH — HEALTH STABILITY: PHYSICAL HEALTH: ON AVERAGE, HOW MANY MINUTES DO YOU ENGAGE IN EXERCISE AT THIS LEVEL?: 40 MIN

## 2025-01-01 SDOH — ECONOMIC STABILITY: TRANSPORTATION INSECURITY
IN THE PAST 12 MONTHS, HAS LACK OF TRANSPORTATION KEPT YOU FROM MEETINGS, WORK, OR FROM GETTING THINGS NEEDED FOR DAILY LIVING?: NO

## 2025-01-01 SDOH — ECONOMIC STABILITY: INCOME INSECURITY: HOW HARD IS IT FOR YOU TO PAY FOR THE VERY BASICS LIKE FOOD, HOUSING, MEDICAL CARE, AND HEATING?: NOT HARD AT ALL

## 2025-01-01 SDOH — HEALTH STABILITY: MENTAL HEALTH
STRESS IS WHEN SOMEONE FEELS TENSE, NERVOUS, ANXIOUS, OR CAN'T SLEEP AT NIGHT BECAUSE THEIR MIND IS TROUBLED. HOW STRESSED ARE YOU?: NOT AT ALL

## 2025-01-01 SDOH — ECONOMIC STABILITY: FOOD INSECURITY: WITHIN THE PAST 12 MONTHS, THE FOOD YOU BOUGHT JUST DIDN'T LAST AND YOU DIDN'T HAVE MONEY TO GET MORE.: NEVER TRUE

## 2025-01-01 SDOH — ECONOMIC STABILITY: INCOME INSECURITY: IN THE LAST 12 MONTHS, WAS THERE A TIME WHEN YOU WERE NOT ABLE TO PAY THE MORTGAGE OR RENT ON TIME?: NO

## 2025-01-01 SDOH — ECONOMIC STABILITY: HOUSING INSECURITY
IN THE LAST 12 MONTHS, WAS THERE A TIME WHEN YOU DID NOT HAVE A STEADY PLACE TO SLEEP OR SLEPT IN A SHELTER (INCLUDING NOW)?: NO

## 2025-01-01 SDOH — ECONOMIC STABILITY: TRANSPORTATION INSECURITY
IN THE PAST 12 MONTHS, HAS THE LACK OF TRANSPORTATION KEPT YOU FROM MEDICAL APPOINTMENTS OR FROM GETTING MEDICATIONS?: NO

## 2025-01-01 SDOH — ECONOMIC STABILITY: TRANSPORTATION INSECURITY
IN THE PAST 12 MONTHS, HAS LACK OF RELIABLE TRANSPORTATION KEPT YOU FROM MEDICAL APPOINTMENTS, MEETINGS, WORK OR FROM GETTING THINGS NEEDED FOR DAILY LIVING?: NO

## 2025-01-01 ASSESSMENT — LIFESTYLE VARIABLES
HOW OFTEN DO YOU HAVE A DRINK CONTAINING ALCOHOL: 2-3 TIMES A WEEK
HOW OFTEN DO YOU HAVE SIX OR MORE DRINKS ON ONE OCCASION: NEVER
AUDIT-C TOTAL SCORE: 4
HOW MANY STANDARD DRINKS CONTAINING ALCOHOL DO YOU HAVE ON A TYPICAL DAY: 3 OR 4
SKIP TO QUESTIONS 9-10: 0

## 2025-01-01 ASSESSMENT — SOCIAL DETERMINANTS OF HEALTH (SDOH)
IN A TYPICAL WEEK, HOW MANY TIMES DO YOU TALK ON THE PHONE WITH FAMILY, FRIENDS, OR NEIGHBORS?: ONCE A WEEK
IN A TYPICAL WEEK, HOW MANY TIMES DO YOU TALK ON THE PHONE WITH FAMILY, FRIENDS, OR NEIGHBORS?: ONCE A WEEK
HOW OFTEN DO YOU GET TOGETHER WITH FRIENDS OR RELATIVES?: NEVER
HOW OFTEN DO YOU ATTEND CHURCH OR RELIGIOUS SERVICES?: NEVER
HOW OFTEN DO YOU ATTENT MEETINGS OF THE CLUB OR ORGANIZATION YOU BELONG TO?: 1 TO 4 TIMES PER YEAR
HOW OFTEN DO YOU GET TOGETHER WITH FRIENDS OR RELATIVES?: NEVER
HOW MANY DRINKS CONTAINING ALCOHOL DO YOU HAVE ON A TYPICAL DAY WHEN YOU ARE DRINKING: 3 OR 4
IN THE PAST 12 MONTHS, HAS THE ELECTRIC, GAS, OIL, OR WATER COMPANY THREATENED TO SHUT OFF SERVICE IN YOUR HOME?: NO
WITHIN THE PAST 12 MONTHS, YOU WORRIED THAT YOUR FOOD WOULD RUN OUT BEFORE YOU GOT THE MONEY TO BUY MORE: NEVER TRUE
HOW HARD IS IT FOR YOU TO PAY FOR THE VERY BASICS LIKE FOOD, HOUSING, MEDICAL CARE, AND HEATING?: NOT HARD AT ALL
HOW OFTEN DO YOU ATTENT MEETINGS OF THE CLUB OR ORGANIZATION YOU BELONG TO?: 1 TO 4 TIMES PER YEAR
DO YOU BELONG TO ANY CLUBS OR ORGANIZATIONS SUCH AS CHURCH GROUPS UNIONS, FRATERNAL OR ATHLETIC GROUPS, OR SCHOOL GROUPS?: YES
HOW OFTEN DO YOU ATTEND CHURCH OR RELIGIOUS SERVICES?: NEVER
HOW OFTEN DO YOU HAVE A DRINK CONTAINING ALCOHOL: 2-3 TIMES A WEEK
HOW OFTEN DO YOU HAVE SIX OR MORE DRINKS ON ONE OCCASION: NEVER
DO YOU BELONG TO ANY CLUBS OR ORGANIZATIONS SUCH AS CHURCH GROUPS UNIONS, FRATERNAL OR ATHLETIC GROUPS, OR SCHOOL GROUPS?: YES

## 2025-01-06 ENCOUNTER — OFFICE VISIT (OUTPATIENT)
Dept: MEDICAL GROUP | Facility: PHYSICIAN GROUP | Age: 60
End: 2025-01-06
Payer: COMMERCIAL

## 2025-01-06 VITALS
HEART RATE: 65 BPM | HEIGHT: 72 IN | RESPIRATION RATE: 16 BRPM | BODY MASS INDEX: 29.66 KG/M2 | TEMPERATURE: 97.9 F | DIASTOLIC BLOOD PRESSURE: 70 MMHG | SYSTOLIC BLOOD PRESSURE: 118 MMHG | WEIGHT: 219 LBS

## 2025-01-06 DIAGNOSIS — I10 PRIMARY HYPERTENSION: ICD-10-CM

## 2025-01-06 DIAGNOSIS — Z13.9 ENCOUNTER FOR SCREENING: ICD-10-CM

## 2025-01-06 DIAGNOSIS — R42 DIZZINESS ON STANDING: ICD-10-CM

## 2025-01-06 DIAGNOSIS — T46.6X5A STATIN MYOPATHY: ICD-10-CM

## 2025-01-06 DIAGNOSIS — Z86.0100 HISTORY OF COLON POLYPS: ICD-10-CM

## 2025-01-06 DIAGNOSIS — Z23 ENCOUNTER FOR IMMUNIZATION: ICD-10-CM

## 2025-01-06 DIAGNOSIS — G72.0 STATIN MYOPATHY: ICD-10-CM

## 2025-01-06 DIAGNOSIS — E78.49 OTHER HYPERLIPIDEMIA: ICD-10-CM

## 2025-01-06 PROBLEM — R73.09 ABNORMAL GLUCOSE: Status: RESOLVED | Noted: 2022-04-18 | Resolved: 2025-01-06

## 2025-01-06 PROCEDURE — 90471 IMMUNIZATION ADMIN: CPT

## 2025-01-06 PROCEDURE — 99204 OFFICE O/P NEW MOD 45 MIN: CPT | Mod: 25

## 2025-01-06 PROCEDURE — 90656 IIV3 VACC NO PRSV 0.5 ML IM: CPT

## 2025-01-06 ASSESSMENT — FIBROSIS 4 INDEX: FIB4 SCORE: 1.35

## 2025-01-06 ASSESSMENT — ENCOUNTER SYMPTOMS
COUGH: 0
INSOMNIA: 0
DIARRHEA: 0
CONSTIPATION: 0
HEADACHES: 0
WEIGHT LOSS: 0
DEPRESSION: 0
BACK PAIN: 0
HEARTBURN: 0

## 2025-01-06 ASSESSMENT — PATIENT HEALTH QUESTIONNAIRE - PHQ9: CLINICAL INTERPRETATION OF PHQ2 SCORE: 0

## 2025-01-06 NOTE — PROGRESS NOTES
"Subjective:     CC:  Diagnoses of Dizziness on standing, Primary hypertension, Other hyperlipidemia, Statin myopathy, History of colon polyps, Encounter for screening, and Encounter for immunization were pertinent to this visit.    HISTORY OF THE PRESENT ILLNESS: Patient is a 59 y.o. male. This pleasant patient is here today to establish care and discuss persistent/worsening dizziness on standing.     Problem   Statin Myopathy    Has stopped statins in the past because of muscle related pain but conintued them because his cholesterol numbers went up and he could tolerate this side effect.  However it was never confirmed that this was actually due to statin use.  He states that he continues to have symtoms, although he expresses doubt that it is the statin medication that is causing it.     Dizziness On Standing    Patient states he has had similar symptoms over the last several years has noticed more frequently.  He did complete a cardiac stress test a couple years ago that came back normal, his blood pressure has remained in normal limits, he has never had problems with low blood sugar, and neurologically he is intact without issue.  He states that he has had several family members with \"clogged carotid arteries that needed surgery to unclog them\"     History of Colon Polyps    Ordering colonoscopy1/2025, denies colon ca fam hx      Other Hyperlipidemia    Onset: 2022  Risk-Enhancing Factor(s): None  Clinical ASCVD: No  LDL >190: No  ASCVD risk: 5.5%    Current med: Atorvastatin 40 mg  Side effects: Generalized muscle pain that her tolerates    Lab Results   Component Value Date/Time    CHOLSTRLTOT 176 05/21/2024 0812    TRIGLYCERIDE 73 05/21/2024 0812    HDL 51 05/21/2024 0812     (H) 05/21/2024 0812        Primary Hypertension    Has been on current regimen for 5 years, taking amlodipine 10 mg daily and lisinopril 2.5 mg daily     Abnormal Glucose (Resolved)       Health Maintenance: Completed    ROS: "   Review of Systems   Constitutional:  Negative for weight loss.   Respiratory:  Negative for cough.    Cardiovascular:  Negative for leg swelling.   Gastrointestinal:  Negative for constipation, diarrhea and heartburn.   Genitourinary:  Negative for dysuria, frequency and urgency.   Musculoskeletal:  Negative for back pain.   Neurological:  Negative for headaches.   Psychiatric/Behavioral:  Negative for depression. The patient does not have insomnia.          Objective:     Exam: /70 (BP Location: Right arm, Patient Position: Sitting, BP Cuff Size: Adult long)   Pulse 65   Temp 36.6 °C (97.9 °F) (Temporal)   Resp 16   Ht 1.829 m (6')   Wt 99.3 kg (219 lb)  Body mass index is 29.7 kg/m².    Physical Exam  Constitutional:       Appearance: Normal appearance.   HENT:      Right Ear: Tympanic membrane and ear canal normal.      Left Ear: Tympanic membrane and ear canal normal.      Mouth/Throat:      Mouth: Mucous membranes are moist.      Pharynx: Oropharynx is clear.   Eyes:      Conjunctiva/sclera: Conjunctivae normal.      Pupils: Pupils are equal, round, and reactive to light.   Cardiovascular:      Rate and Rhythm: Normal rate and regular rhythm.      Pulses:           Carotid pulses are 2+ on the right side and 2+ on the left side with bruit.       Radial pulses are 2+ on the right side and 2+ on the left side.      Heart sounds: Normal heart sounds. No murmur heard.  Pulmonary:      Effort: No respiratory distress.      Breath sounds: No wheezing.   Abdominal:      General: There is no distension.      Tenderness: There is no abdominal tenderness. There is no right CVA tenderness or left CVA tenderness.   Musculoskeletal:         General: No swelling.      Cervical back: Normal range of motion.      Right lower leg: No edema.      Left lower leg: No edema.   Skin:     General: Skin is warm and dry.      Capillary Refill: Capillary refill takes less than 2 seconds.      Findings: No rash.    Neurological:      General: No focal deficit present.      Mental Status: He is alert and oriented to person, place, and time.      Deep Tendon Reflexes: Reflexes normal.   Psychiatric:         Mood and Affect: Mood normal.               Assessment & Plan:   59 y.o. male with the following -    Assessment & Plan  Dizziness on standing  -Chronic, worsening patient states he does have a family history of close male relatives who have had to undergo carotid endarterectomy  -Discussed signs and symptoms that would require immediate evaluation with the patient  -Relevant laboratory work ordered today includes a CBC  -Ultrasound carotid Doppler bilateral ordered today as well to assess for carotid stenosis  Orders:    US-CAROTID DOPPLER BILAT; Future    CBC WITH DIFFERENTIAL; Future    Primary hypertension  -Chronic, stable well-controlled  -Continue current regimen of amlodipine 10 mg daily and lisinopril 2.5 mg daily  -Relevant laboratory work ordered today includes CBC and CMP  Orders:    CBC WITH DIFFERENTIAL; Future    Comp Metabolic Panel; Future    Other hyperlipidemia  -Chronic, stable  -Continue current regimen of atorvastatin 40 mg daily  -Updated relevant laboratory work ordered today includes lipid profile  Orders:    Lipid Profile; Future    Statin myopathy  -Patient stated that there was a cord syndrome full finding several years ago when he initiated statin use of joint and muscle pain, states that he noticed it coinciding with initiation of the medication however when he stopped the medication the symptoms did not resolve making him believe that it might not be related to the medication.  However given his symptoms it is still suspicious for possible statin myopathy.  -Will order CK isoenzymes today to assess for muscle pathology  Orders:    CK ISOENZYMES SERUM; Future    History of colon polyps  -Patient stated he has had polyps on colonoscopy and previous screenings  -He was told come back in 3 to 5  years, his last colonoscopy was 2020, I recommended he get one done soon  -Furl to GI colonoscopy sent today  Orders:    Referral to GI for Colonoscopy    Encounter for screening  -Relevant screenings discussed with the patient  -HIV, hepatitis C, and vitamin D ordered today to assess status  Orders:    HIV AG/AB COMBO ASSAY SCREENING; Future    HEP C VIRUS ANTIBODY; Future    Referral to GI for Colonoscopy    VITAMIN D,25 HYDROXY (DEFICIENCY); Future    Encounter for immunization  -Relevant immunizations discussed with the patient  -Valenza vaccine ordered and administered in clinic today  Orders:    INFLUENZA VACCINE TRI INJ (PF)       Return in about 6 months (around 7/6/2025) for f/u.    Please note that this dictation was created using voice recognition software. I have made every reasonable attempt to correct obvious errors, but I expect that there are errors of grammar and possibly content that I did not discover before finalizing the note.        Amos Angeles D.O.

## 2025-01-07 NOTE — ASSESSMENT & PLAN NOTE
-Patient stated that there was a cord syndrome full finding several years ago when he initiated statin use of joint and muscle pain, states that he noticed it coinciding with initiation of the medication however when he stopped the medication the symptoms did not resolve making him believe that it might not be related to the medication.  However given his symptoms it is still suspicious for possible statin myopathy.  -Will order CK isoenzymes today to assess for muscle pathology  Orders:    CK ISOENZYMES SERUM; Future

## 2025-01-07 NOTE — ASSESSMENT & PLAN NOTE
-Chronic, stable well-controlled  -Continue current regimen of amlodipine 10 mg daily and lisinopril 2.5 mg daily  -Relevant laboratory work ordered today includes CBC and CMP  Orders:    CBC WITH DIFFERENTIAL; Future    Comp Metabolic Panel; Future

## 2025-01-07 NOTE — ASSESSMENT & PLAN NOTE
-Chronic, stable  -Continue current regimen of atorvastatin 40 mg daily  -Updated relevant laboratory work ordered today includes lipid profile  Orders:    Lipid Profile; Future

## 2025-01-07 NOTE — ASSESSMENT & PLAN NOTE
-Patient stated he has had polyps on colonoscopy and previous screenings  -He was told come back in 3 to 5 years, his last colonoscopy was 2020, I recommended he get one done soon  -Furl to GI colonoscopy sent today  Orders:    Referral to GI for Colonoscopy

## 2025-01-07 NOTE — ASSESSMENT & PLAN NOTE
-Chronic, worsening patient states he does have a family history of close male relatives who have had to undergo carotid endarterectomy  -Discussed signs and symptoms that would require immediate evaluation with the patient  -Relevant laboratory work ordered today includes a CBC  -Ultrasound carotid Doppler bilateral ordered today as well to assess for carotid stenosis  Orders:    US-CAROTID DOPPLER BILAT; Future    CBC WITH DIFFERENTIAL; Future

## 2025-01-14 ENCOUNTER — HOSPITAL ENCOUNTER (OUTPATIENT)
Dept: RADIOLOGY | Facility: MEDICAL CENTER | Age: 60
End: 2025-01-14
Payer: COMMERCIAL

## 2025-01-14 DIAGNOSIS — R42 DIZZINESS ON STANDING: ICD-10-CM

## 2025-01-14 PROCEDURE — 93880 EXTRACRANIAL BILAT STUDY: CPT

## 2025-01-15 ENCOUNTER — HOSPITAL ENCOUNTER (OUTPATIENT)
Dept: LAB | Facility: MEDICAL CENTER | Age: 60
End: 2025-01-15
Payer: COMMERCIAL

## 2025-01-15 DIAGNOSIS — Z13.9 ENCOUNTER FOR SCREENING: ICD-10-CM

## 2025-01-15 DIAGNOSIS — R42 DIZZINESS ON STANDING: ICD-10-CM

## 2025-01-15 DIAGNOSIS — I10 PRIMARY HYPERTENSION: ICD-10-CM

## 2025-01-15 DIAGNOSIS — E78.49 OTHER HYPERLIPIDEMIA: ICD-10-CM

## 2025-01-15 DIAGNOSIS — T46.6X5A STATIN MYOPATHY: ICD-10-CM

## 2025-01-15 DIAGNOSIS — G72.0 STATIN MYOPATHY: ICD-10-CM

## 2025-01-15 LAB
25(OH)D3 SERPL-MCNC: 69 NG/ML (ref 30–100)
ALBUMIN SERPL BCP-MCNC: 4.6 G/DL (ref 3.2–4.9)
ALBUMIN/GLOB SERPL: 1.8 G/DL
ALP SERPL-CCNC: 71 U/L (ref 30–99)
ALT SERPL-CCNC: 26 U/L (ref 2–50)
ANION GAP SERPL CALC-SCNC: 13 MMOL/L (ref 7–16)
AST SERPL-CCNC: 24 U/L (ref 12–45)
BASOPHILS # BLD AUTO: 1.3 % (ref 0–1.8)
BASOPHILS # BLD: 0.06 K/UL (ref 0–0.12)
BILIRUB SERPL-MCNC: 0.4 MG/DL (ref 0.1–1.5)
BUN SERPL-MCNC: 18 MG/DL (ref 8–22)
CALCIUM ALBUM COR SERPL-MCNC: 9.1 MG/DL (ref 8.5–10.5)
CALCIUM SERPL-MCNC: 9.6 MG/DL (ref 8.5–10.5)
CHLORIDE SERPL-SCNC: 103 MMOL/L (ref 96–112)
CHOLEST SERPL-MCNC: 155 MG/DL (ref 100–199)
CO2 SERPL-SCNC: 22 MMOL/L (ref 20–33)
CREAT SERPL-MCNC: 1.01 MG/DL (ref 0.5–1.4)
EOSINOPHIL # BLD AUTO: 0.14 K/UL (ref 0–0.51)
EOSINOPHIL NFR BLD: 3.1 % (ref 0–6.9)
ERYTHROCYTE [DISTWIDTH] IN BLOOD BY AUTOMATED COUNT: 38 FL (ref 35.9–50)
FASTING STATUS PATIENT QL REPORTED: NORMAL
GFR SERPLBLD CREATININE-BSD FMLA CKD-EPI: 86 ML/MIN/1.73 M 2
GLOBULIN SER CALC-MCNC: 2.5 G/DL (ref 1.9–3.5)
GLUCOSE SERPL-MCNC: 94 MG/DL (ref 65–99)
HCT VFR BLD AUTO: 45.9 % (ref 42–52)
HCV AB SER QL: NORMAL
HDLC SERPL-MCNC: 46 MG/DL
HGB BLD-MCNC: 15.5 G/DL (ref 14–18)
HIV 1+2 AB+HIV1 P24 AG SERPL QL IA: NORMAL
IMM GRANULOCYTES # BLD AUTO: 0.02 K/UL (ref 0–0.11)
IMM GRANULOCYTES NFR BLD AUTO: 0.4 % (ref 0–0.9)
LDLC SERPL CALC-MCNC: 92 MG/DL
LYMPHOCYTES # BLD AUTO: 1.43 K/UL (ref 1–4.8)
LYMPHOCYTES NFR BLD: 31.6 % (ref 22–41)
MCH RBC QN AUTO: 29.2 PG (ref 27–33)
MCHC RBC AUTO-ENTMCNC: 33.8 G/DL (ref 32.3–36.5)
MCV RBC AUTO: 86.6 FL (ref 81.4–97.8)
MONOCYTES # BLD AUTO: 0.37 K/UL (ref 0–0.85)
MONOCYTES NFR BLD AUTO: 8.2 % (ref 0–13.4)
NEUTROPHILS # BLD AUTO: 2.51 K/UL (ref 1.82–7.42)
NEUTROPHILS NFR BLD: 55.4 % (ref 44–72)
NRBC # BLD AUTO: 0 K/UL
NRBC BLD-RTO: 0 /100 WBC (ref 0–0.2)
PLATELET # BLD AUTO: 196 K/UL (ref 164–446)
PMV BLD AUTO: 11 FL (ref 9–12.9)
POTASSIUM SERPL-SCNC: 4.6 MMOL/L (ref 3.6–5.5)
PROT SERPL-MCNC: 7.1 G/DL (ref 6–8.2)
RBC # BLD AUTO: 5.3 M/UL (ref 4.7–6.1)
SODIUM SERPL-SCNC: 138 MMOL/L (ref 135–145)
TRIGL SERPL-MCNC: 86 MG/DL (ref 0–149)
WBC # BLD AUTO: 4.5 K/UL (ref 4.8–10.8)

## 2025-01-15 PROCEDURE — 80053 COMPREHEN METABOLIC PANEL: CPT

## 2025-01-15 PROCEDURE — 82552 ASSAY OF CPK IN BLOOD: CPT

## 2025-01-15 PROCEDURE — 36415 COLL VENOUS BLD VENIPUNCTURE: CPT

## 2025-01-15 PROCEDURE — 80061 LIPID PANEL: CPT

## 2025-01-15 PROCEDURE — 85025 COMPLETE CBC W/AUTO DIFF WBC: CPT

## 2025-01-15 PROCEDURE — 87389 HIV-1 AG W/HIV-1&-2 AB AG IA: CPT

## 2025-01-15 PROCEDURE — 82550 ASSAY OF CK (CPK): CPT

## 2025-01-15 PROCEDURE — 86803 HEPATITIS C AB TEST: CPT

## 2025-01-15 PROCEDURE — 82306 VITAMIN D 25 HYDROXY: CPT

## 2025-01-18 LAB
CK BB CFR SERPL ELPH: 0 % (ref 0–0)
CK MACRO1 CFR SERPL: 0 % (ref 0–0)
CK MACRO2 CFR SERPL: 0 % (ref 0–0)
CK MB CFR SERPL ELPH: 0 % (ref 0–4)
CK MM CFR SERPL ELPH: 100 % (ref 96–100)
CK SERPL-CCNC: 122 U/L (ref 39–308)

## 2025-04-22 ENCOUNTER — OFFICE VISIT (OUTPATIENT)
Dept: DERMATOLOGY | Facility: IMAGING CENTER | Age: 60
End: 2025-04-22
Payer: COMMERCIAL

## 2025-04-22 DIAGNOSIS — D22.9 MULTIPLE BENIGN NEVI: ICD-10-CM

## 2025-04-22 DIAGNOSIS — Z12.83 SKIN CANCER SCREENING: ICD-10-CM

## 2025-04-22 DIAGNOSIS — L73.8 SEBACEOUS HYPERPLASIA: ICD-10-CM

## 2025-04-22 DIAGNOSIS — L57.0 ACTINIC KERATOSIS: ICD-10-CM

## 2025-04-22 DIAGNOSIS — L81.4 LENTIGO: ICD-10-CM

## 2025-04-22 DIAGNOSIS — L82.1 SEBORRHEIC KERATOSES: ICD-10-CM

## 2025-04-22 DIAGNOSIS — D23.9 DERMATOFIBROMA: ICD-10-CM

## 2025-04-22 DIAGNOSIS — D18.01 CHERRY ANGIOMA: ICD-10-CM

## 2025-04-22 PROCEDURE — 99213 OFFICE O/P EST LOW 20 MIN: CPT | Mod: 25 | Performed by: STUDENT IN AN ORGANIZED HEALTH CARE EDUCATION/TRAINING PROGRAM

## 2025-04-22 PROCEDURE — 17003 DESTRUCT PREMALG LES 2-14: CPT | Performed by: STUDENT IN AN ORGANIZED HEALTH CARE EDUCATION/TRAINING PROGRAM

## 2025-04-22 PROCEDURE — 17000 DESTRUCT PREMALG LESION: CPT | Performed by: STUDENT IN AN ORGANIZED HEALTH CARE EDUCATION/TRAINING PROGRAM

## 2025-04-22 NOTE — PROGRESS NOTES
"[facility] DERMATOLOGY CLINIC NOTE    Chief Complaint   Patient presents with    Annual Exam     CARMEN        HPI:    Sergo Allan is a 59 y.o. male here for evaluation of above, CARMEN.     At last visit was seen for Actinic keratoses on scalp treated with LN2, dermatofibroma. Today notes his scalp feels \"scaby\" and would like it examined today as well.     Other skin lesions of concern:     1) Skin growth on: Right cheek    Duration: x a few months    Associated symptoms: bleed when he shaves due to razor getting caught on spots    Prior treatments: none     No other symptomatic (itching, painful, burning) or changing lesions.       Dermatology History:      - Prior skin cancer risk factors: none, tries to protect from sun wear hats, etc       - Family history of skin cancer: None     - Prior history of skin cancer: Actinic keratoses, no history of NMSC       Review of Systems: No fevers, chill. Pertinent positives and negatives above.       Medications, Medical History, Surgical History, Family History & Allergies:  Reviewed in the chart, relevant history noted above.         PHYSICAL EXAM, ASSESSMENT, & PLAN (per problem):   A total body skin exam was performed including the following areas: head (including face), neck, chest, abdomen, groin/buttocks (excluding genitals), back, bilateral upper extremities, and bilateral lower extremities with the following pertinent findings in assessment/plan.      Sebaceous Hyperplasia  Exam: Forehead, temples with scattered yellowish/red papules with telangiectasias and central dell    benign, reassurance   Can call office in future for LN2 (cosmetic fee) if desired for treatment       Actinic Keratosis  Exam: Gritty pink papules on bilateral temples n =3     Pre-cancerous nature of lesions and potential for progression to SCC if left untreated over time discussed.  Options for treatment include observation, destructive (liquid nitrogen), chemotherapy creams, and/or PDT light " treatment.   Patient elects to proceed with LN2 destructive treatment today of largest/thickest lesions. Risks (including, but not limited to: hypo or hyperpigmentation, redness, blister, scar, recurrence) and benefits of cryotherapy discussed. PROCEDURE NOTE: 2 cryotherapy cycles x 10 seconds applied to 3 lesion/s in location as specified above. Aftercare instructions discussed.   If lesions do not heal or continue to grow, crust, bleed, etc, please call office sooner for re-evaluation.         Benign appearing melanocytic nevi   Exam: pink soft papule/s on trunk consistent with IDN     Discussed nevi are benign appearing on exam, symmetrical and regular pigment network on dermoscopy   ABCDEs of melanoma discussed, call office for sooner evaluation for concerning changes/growth    Dermatofibromas  Exam: pink to hyperpigmented, dome-shaped papule on  left lateral shin, lateral ankle. No erosion or crusting today.   - benign, reassurance, discussed scar like reaction   - call if changes, growth, becomes traumatized again       Lentigos / lentigines   Exam: scattered light to medium brown macules over photodistributed areas of face, upper shoulders, arms     benign, reassurance   ABCDEs of melanoma discussed       Seborrheic keratosis   Exam: scattered tan to brown verrucous papules and plaques on trunk and extremities     benign, reassurance       Cherry Angiomas   Exam: scattered 1-3mm bright red macules and thin papules on trunk and extremities     benign, reassurance       Skin Cancer Prevention Counseling   Advise regular sun protection/sunscreen use, SPF 30 or greater with broad spectrum coverage need for reapplication every  minutes.   Recommend broad brimmed hats, UPF sun protective clothing when outdoors for extended periods of time   Recommend self checks at home,  ABCDEs of melanoma discussed           Follow up: Return in about 1 year (around 4/22/2026) for CARMEN, sooner as needed for spots of  concern.        Kathy Dumont MD   [facility]

## 2025-05-28 ENCOUNTER — PATIENT MESSAGE (OUTPATIENT)
Dept: MEDICAL GROUP | Facility: PHYSICIAN GROUP | Age: 60
End: 2025-05-28
Payer: COMMERCIAL

## 2025-05-29 ENCOUNTER — TELEPHONE (OUTPATIENT)
Dept: URGENT CARE | Facility: PHYSICIAN GROUP | Age: 60
End: 2025-05-29

## 2025-05-29 DIAGNOSIS — N52.9 ERECTILE DYSFUNCTION, UNSPECIFIED ERECTILE DYSFUNCTION TYPE: ICD-10-CM

## 2025-05-29 DIAGNOSIS — E78.49 OTHER HYPERLIPIDEMIA: ICD-10-CM

## 2025-05-29 DIAGNOSIS — I10 PRIMARY HYPERTENSION: ICD-10-CM

## 2025-05-29 RX ORDER — SILDENAFIL 50 MG/1
TABLET, FILM COATED ORAL
Qty: 10 TABLET | Refills: 0 | Status: SHIPPED | OUTPATIENT
Start: 2025-05-29

## 2025-05-29 RX ORDER — LISINOPRIL 2.5 MG/1
2.5 TABLET ORAL DAILY
Qty: 90 TABLET | Refills: 0 | Status: SHIPPED | OUTPATIENT
Start: 2025-05-29 | End: 2026-05-29

## 2025-05-29 RX ORDER — ATORVASTATIN CALCIUM 40 MG/1
40 TABLET, FILM COATED ORAL DAILY
Qty: 90 TABLET | Refills: 0 | Status: SHIPPED | OUTPATIENT
Start: 2025-05-29 | End: 2026-05-29

## 2025-05-29 RX ORDER — AMLODIPINE BESYLATE 10 MG/1
10 TABLET ORAL DAILY
Qty: 90 TABLET | Refills: 0 | Status: SHIPPED | OUTPATIENT
Start: 2025-05-29 | End: 2026-05-29

## 2025-05-29 NOTE — TELEPHONE ENCOUNTER
Received request via: Patient    Was the patient seen in the last year in this department? Yes    Does the patient have an active prescription (recently filled or refills available) for medication(s) requested? No    Pharmacy Name: [facility]    Does the patient have FCI Plus and need 100-day supply? (This applies to ALL medications) Patient does not have SCP

## 2025-07-15 ENCOUNTER — OFFICE VISIT (OUTPATIENT)
Dept: MEDICAL GROUP | Facility: PHYSICIAN GROUP | Age: 60
End: 2025-07-15
Payer: COMMERCIAL

## 2025-07-15 VITALS
OXYGEN SATURATION: 96 % | SYSTOLIC BLOOD PRESSURE: 126 MMHG | TEMPERATURE: 98.2 F | DIASTOLIC BLOOD PRESSURE: 82 MMHG | WEIGHT: 213.8 LBS | RESPIRATION RATE: 14 BRPM | BODY MASS INDEX: 28.96 KG/M2 | HEART RATE: 66 BPM | HEIGHT: 72 IN

## 2025-07-15 DIAGNOSIS — E78.49 OTHER HYPERLIPIDEMIA: ICD-10-CM

## 2025-07-15 DIAGNOSIS — N52.9 ERECTILE DYSFUNCTION, UNSPECIFIED ERECTILE DYSFUNCTION TYPE: ICD-10-CM

## 2025-07-15 DIAGNOSIS — I10 PRIMARY HYPERTENSION: ICD-10-CM

## 2025-07-15 DIAGNOSIS — Z23 ENCOUNTER FOR IMMUNIZATION: ICD-10-CM

## 2025-07-15 DIAGNOSIS — R68.82 LOW LIBIDO: Primary | ICD-10-CM

## 2025-07-15 PROCEDURE — 90677 PCV20 VACCINE IM: CPT

## 2025-07-15 PROCEDURE — 90471 IMMUNIZATION ADMIN: CPT

## 2025-07-15 PROCEDURE — 3079F DIAST BP 80-89 MM HG: CPT

## 2025-07-15 PROCEDURE — 3074F SYST BP LT 130 MM HG: CPT

## 2025-07-15 PROCEDURE — 99214 OFFICE O/P EST MOD 30 MIN: CPT | Mod: 25

## 2025-07-15 RX ORDER — SILDENAFIL 50 MG/1
TABLET, FILM COATED ORAL
Qty: 10 TABLET | Refills: 3 | Status: SHIPPED | OUTPATIENT
Start: 2025-07-15

## 2025-07-15 RX ORDER — AMLODIPINE BESYLATE 10 MG/1
10 TABLET ORAL DAILY
Qty: 100 TABLET | Refills: 3 | Status: SHIPPED | OUTPATIENT
Start: 2025-07-15

## 2025-07-15 RX ORDER — LISINOPRIL 2.5 MG/1
2.5 TABLET ORAL DAILY
Qty: 100 TABLET | Refills: 3 | Status: SHIPPED | OUTPATIENT
Start: 2025-07-15

## 2025-07-15 RX ORDER — ATORVASTATIN CALCIUM 40 MG/1
40 TABLET, FILM COATED ORAL DAILY
Qty: 100 TABLET | Refills: 3 | Status: SHIPPED | OUTPATIENT
Start: 2025-07-15

## 2025-07-15 ASSESSMENT — FIBROSIS 4 INDEX: FIB4 SCORE: 1.39

## 2025-07-15 NOTE — PROGRESS NOTES
Verbal consent was acquired by the patient to use [employer] ambient listening note generation during this visit     Subjective:     HPI:   History of Present Illness  The patient presents for a follow-up visit.    Decrease in Libido and Fatigue  He reports a decrease in libido, which he attributes to aging. He has been experiencing this issue since his teenage years, with a noticeable decline over the past 2 years. He also mentions feeling fatigued and not getting a full 8 hours of sleep due to his work schedule. He has been taking Viagra, which he finds helpful.  - Onset: Since teenage years, with a noticeable decline over the past 2 years.  - Duration: Persistent issue, worsened over the past 2 years.  - Character: Decrease in libido and fatigue.  - Alleviating Factors: Viagra is helpful.  - Timing: Not getting a full 8 hours of sleep due to work schedule.    Atorvastatin Use and Gym Routine  He has been on atorvastatin for an extended period and reports no muscle pain. He started a gym routine in 11/2024 and initially experienced some discomfort, but this subsided after a few days of rest. He recalls discontinuing atorvastatin abruptly 15 years ago, which resulted in an increase in his cholesterol levels. He then resumed the medication.  - Onset: Started gym routine in 11/2024; discontinued atorvastatin 15 years ago.  - Duration: Extended period on atorvastatin; discomfort subsided after a few days of rest.  - Character: No muscle pain; initial discomfort with gym routine.  - Alleviating Factors: Discomfort subsided after a few days of rest.  - Timing: Discontinued atorvastatin abruptly 15 years ago, resumed medication.    Colonoscopy Results  He underwent a colonoscopy at [facility], but the results have not been communicated to him yet. This was his third colonoscopy, with the previous two showing polyps.  - Onset: Recent colonoscopy at [facility].  - Duration: Previous colonoscopies showed polyps.  -  Character: Awaiting results of the recent colonoscopy.    Blood Pressure Control  His blood pressure is well-controlled with daily doses of amlodipine and lisinopril. He has also lost weight over the past 6 to 8 months due to regular gym workouts.  - Alleviating Factors: Daily doses of amlodipine and lisinopril; regular gym workouts.  - Duration: Weight loss over the past 6 to 8 months.  - Character: Well-controlled blood pressure.    Social History:  Sleep: He reports not getting a full 8 hours of sleep due to his work schedule.    PAST SURGICAL HISTORY:  Colonoscopy (03/05/2010, 12/2024, 07/2025)    Health Maintenance: Completed    Objective:     Exam:  /82   Pulse 66   Temp 36.8 °C (98.2 °F) (Temporal)   Resp 14   Ht 1.829 m (6')   Wt 97 kg (213 lb 12.8 oz)   SpO2 96%   BMI 29.00 kg/m²  Body mass index is 29 kg/m².    Physical Exam  Constitutional:       General: He is not in acute distress.     Appearance: Normal appearance. He is not ill-appearing.   Eyes:      Conjunctiva/sclera: Conjunctivae normal.   Cardiovascular:      Rate and Rhythm: Normal rate and regular rhythm.      Heart sounds: No murmur heard.  Pulmonary:      Effort: Pulmonary effort is normal. No respiratory distress.      Breath sounds: Normal breath sounds. No wheezing or rhonchi.   Skin:     General: Skin is warm and dry.      Capillary Refill: Capillary refill takes less than 2 seconds.   Neurological:      General: No focal deficit present.      Mental Status: He is alert and oriented to person, place, and time.   Psychiatric:         Mood and Affect: Mood normal.             Results  Labs   - Cholesterol levels: Normal    Imaging   - Carotid ultrasound: No specific abnormalities    Assessment & Plan:     1. Low libido  Testosterone, Free & Total, Adult Male (w/SHBG)      2. Encounter for immunization  Pneumococcal Conjugate Vaccine 20-Valent (6 wks+)      3. Primary hypertension  amLODIPine (NORVASC) 10 MG Tab    lisinopril  (PRINIVIL) 2.5 MG Tab      4. Other hyperlipidemia  atorvastatin (LIPITOR) 40 MG Tab      5. Erectile dysfunction, unspecified erectile dysfunction type  sildenafil citrate (VIAGRA) 50 MG tablet          Assessment & Plan  1. Hypertension: Stable.  - Blood pressure is well-managed with the current regimen of amlodipine and lisinopril, taken daily.  - Continued use of amlodipine and lisinopril is advised.  - Refills for amlodipine and lisinopril have been sent to the pharmacy.    2. Hyperlipidemia.  - Cholesterol levels have improved due to atorvastatin.  - Reports muscle pain, likely related to workout routine rather than medication, as liver enzymes are normal.  - Continued use of atorvastatin is advised.  - Refill for atorvastatin has been sent to the pharmacy.    3. Low libido.  - Reports significant decrease in sex drive over the years; Viagra effective for erectile function but not for desire.  - No fatigue or weight gain noted; lost weight due to gym routine.  - Testosterone level test ordered to assess hormonal status; to be done between 8 and 10 AM without fasting.  - Refill for Viagra has been sent to the pharmacy.    4. Health maintenance.  - Colonoscopy results from [facility] are pending; records request will be sent to obtain results.  - Pneumonia vaccine will be administered today.    Follow-up  - Follow-up visit scheduled in 6 months.      Return in about 6 months (around 1/15/2026) for f/u Blood Pressure/libido.    Please note that this dictation was created using voice recognition software. I have made every reasonable attempt to correct obvious errors, but I expect that there are errors of grammar and possibly content that I did not discover before finalizing the note.

## 2025-07-15 NOTE — LETTER
[facility]  Amos Angeles D.O.  [address] [place] [address]  Fax: [phone]   Authorization for Release/Disclosure of   Protected Health Information   Name: SERGO ALLAN : 1965 SSN: [id number]   Address: [address] [place] [id number] Phone:    There are no phone numbers on file.   I authorize the entity listed below to release/disclose the PHI below to:   [facility]/Amos Angeles D.O. and Amos Angeles D.O.   Provider or Entity Name:  [facility]    Address   City, [facility], [facility]   Phone:      Fax:     Reason for request: continuity of care   Information to be released:    [ XXX ] LAST COLONOSCOPY,  including any PATH REPORT and follow-up  [  ] LAST FIT/COLOGUARD RESULT [  ] LAST DEXA  [  ] LAST MAMMOGRAM  [  ] LAST PAP  [  ] LAST LABS [  ] RETINA EXAM REPORT  [  ] IMMUNIZATION RECORDS  [  ] Release all info      [  ] Check here and initial the line next to each item to release ALL health information INCLUDING  _____ Care and treatment for drug and / or alcohol abuse  _____ HIV testing, infection status, or AIDS  _____ Genetic Testing    DATES OF SERVICE OR TIME PERIOD TO BE DISCLOSED: _____________  I understand and acknowledge that:  * This Authorization may be revoked at any time by you in writing, except if your health information has already been used or disclosed.  * Your health information that will be used or disclosed as a result of you signing this authorization could be re-disclosed by the recipient. If this occurs, your re-disclosed health information may no longer be protected by State or Federal laws.  * You may refuse to sign this Authorization. Your refusal will not affect your ability to obtain treatment.  * This Authorization becomes effective upon signing and will  on (date) __________.      If no date is indicated, this Authorization will  one (1) year from the signature date.    Name: Sergo Allan  Signature: Date:    7/15/2025     PLEASE FAX REQUESTED RECORDS BACK TO: [phone]

## 2025-07-22 ENCOUNTER — HOSPITAL ENCOUNTER (OUTPATIENT)
Dept: LAB | Facility: MEDICAL CENTER | Age: 60
End: 2025-07-22
Payer: COMMERCIAL

## 2025-07-22 DIAGNOSIS — R68.82 LOW LIBIDO: ICD-10-CM

## 2025-07-22 PROCEDURE — 84403 ASSAY OF TOTAL TESTOSTERONE: CPT

## 2025-07-22 PROCEDURE — 84402 ASSAY OF FREE TESTOSTERONE: CPT

## 2025-07-22 PROCEDURE — 84270 ASSAY OF SEX HORMONE GLOBUL: CPT

## 2025-07-22 PROCEDURE — 36415 COLL VENOUS BLD VENIPUNCTURE: CPT

## 2025-07-25 LAB
SHBG SERPL-SCNC: 30 NMOL/L (ref 19–76)
TESTOST FREE MFR SERPL: 1.9 % (ref 1.6–2.9)
TESTOST FREE SERPL-MCNC: 54 PG/ML (ref 47–244)
TESTOST SERPL-MCNC: 285 NG/DL (ref 300–890)